# Patient Record
Sex: FEMALE | Race: WHITE | NOT HISPANIC OR LATINO | Employment: OTHER | URBAN - METROPOLITAN AREA
[De-identification: names, ages, dates, MRNs, and addresses within clinical notes are randomized per-mention and may not be internally consistent; named-entity substitution may affect disease eponyms.]

---

## 2023-09-05 RX ORDER — TORSEMIDE 5 MG/1
5 TABLET ORAL DAILY
COMMUNITY

## 2023-09-05 RX ORDER — LOSARTAN POTASSIUM 25 MG/1
25 TABLET ORAL DAILY
COMMUNITY

## 2023-09-06 ENCOUNTER — ANESTHESIA EVENT (OUTPATIENT)
Dept: SURGERY | Facility: CLINIC | Age: 83
End: 2023-09-06
Payer: COMMERCIAL

## 2023-09-06 NOTE — ANESTHESIA PREPROCEDURE EVALUATION
Anesthesia Pre-Procedure Evaluation    Patient: Gillian Arroyo   MRN: [id number] : 1940        Procedure : Procedure(s):  Phacoemulsification with standard intraocular lens implant          No past medical history on file.   No past surgical history on file.   Not on File   Social History     Tobacco Use     Smoking status: Not on file     Smokeless tobacco: Not on file   Substance Use Topics     Alcohol use: Not on file      Wt Readings from Last 1 Encounters:   No data found for Wt        Anesthesia Evaluation   Pt has had prior anesthetic. Type: General and MAC.        ROS/MED HX  ENT/Pulmonary: Comment: [place]    Hoarse voice    (+)                             recent URI,  last episode :        Neurologic:  - neg neurologic ROS     Cardiovascular:     (+)  hypertension- -   -  - -                           valvular problems/murmurs type: MR     Previous cardiac testing   Echo: Date: 2021 Results:  EF 62% bileaflet  mitral valve prolapse with mild to moderate MV regurgitation grade I diastolic dysfunction, mild Left atrial dilation   Stress Test:  Date: Results:    ECG Reviewed:  Date: 2021 Results:  NSR  Cath:  Date: Results:      METS/Exercise Tolerance:     Hematologic:  - neg hematologic  ROS     Musculoskeletal: Comment: Chronic back pain  Knee pain  Shoulder pain  (+)  arthritis,             GI/Hepatic:       Renal/Genitourinary: Comment: Right adrenal cyst      Endo:       Psychiatric/Substance Use:  - neg psychiatric ROS     Infectious Disease:  - neg infectious disease ROS     Malignancy:   (+) Malignancy, History of Skin.Skin CA status post Surgery.      Other:          Physical Exam    Airway  airway exam normal      Mallampati: II   TM distance: > 3 FB   Neck ROM: full   Mouth opening: > 3 cm    Respiratory Devices and Support         Dental  no notable dental history         Cardiovascular   cardiovascular exam normal       Rhythm and rate: regular and normal     Pulmonary    pulmonary exam normal        breath sounds clear to auscultation         OUTSIDE LABS:  CBC: No results found for: WBC, HGB, HCT, PLT  BMP: No results found for: NA, POTASSIUM, CHLORIDE, CO2, BUN, CR, GLC  COAGS: No results found for: PTT, INR, FIBR  POC: No results found for: BGM, HCG, HCGS  HEPATIC: No results found for: ALBUMIN, PROTTOTAL, ALT, AST, GGT, ALKPHOS, BILITOTAL, BILIDIRECT, JOSE  OTHER: No results found for: PH, LACT, A1C, DEVIN, PHOS, MAG, LIPASE, AMYLASE, TSH, T4, T3, CRP, SED    Anesthesia Plan    ASA Status:  3    NPO Status:  NPO Appropriate    Anesthesia Type: MAC.     - Reason for MAC: straight local not clinically adequate   Induction: Intravenous.   Maintenance: TIVA.        Consents    Anesthesia Plan(s) and associated risks, benefits, and realistic alternatives discussed. Questions answered and patient/representative(s) expressed understanding.     - Discussed:     - Discussed with:  Patient      - Extended Intubation/Ventilatory Support Discussed: No.      - Patient is DNR/DNI Status: No     Use of blood products discussed: No .     Postoperative Care    Pain management: Oral pain medications.        Comments:    Other Comments: The risks and benefits of anesthesia, and the alternatives where applicable, have been discussed with the patient, and they wish to proceed.               CHIKI Guzman CRNA

## 2023-09-07 ENCOUNTER — HOSPITAL ENCOUNTER (OUTPATIENT)
Facility: CLINIC | Age: 83
Discharge: HOME | End: 2023-09-07
Attending: STUDENT IN AN ORGANIZED HEALTH CARE EDUCATION/TRAINING PROGRAM | Admitting: STUDENT IN AN ORGANIZED HEALTH CARE EDUCATION/TRAINING PROGRAM
Payer: COMMERCIAL

## 2023-09-07 ENCOUNTER — ANESTHESIA (OUTPATIENT)
Dept: SURGERY | Facility: CLINIC | Age: 83
End: 2023-09-07
Payer: COMMERCIAL

## 2023-09-07 VITALS
OXYGEN SATURATION: 99 % | DIASTOLIC BLOOD PRESSURE: 85 MMHG | HEART RATE: 72 BPM | SYSTOLIC BLOOD PRESSURE: 136 MMHG | TEMPERATURE: 97.6 F | RESPIRATION RATE: 20 BRPM

## 2023-09-07 PROCEDURE — 370N000004 HC ANESTHESIA CATARACT PACKAGE: Performed by: STUDENT IN AN ORGANIZED HEALTH CARE EDUCATION/TRAINING PROGRAM

## 2023-09-07 PROCEDURE — 360N000007 HC CATARACT SURGICAL PACKAGE: Performed by: STUDENT IN AN ORGANIZED HEALTH CARE EDUCATION/TRAINING PROGRAM

## 2023-09-07 PROCEDURE — 250N000011 HC RX IP 250 OP 636: Performed by: NURSE ANESTHETIST, CERTIFIED REGISTERED

## 2023-09-07 PROCEDURE — 250N000009 HC RX 250: Performed by: STUDENT IN AN ORGANIZED HEALTH CARE EDUCATION/TRAINING PROGRAM

## 2023-09-07 PROCEDURE — V2632 POST CHMBR INTRAOCULAR LENS: HCPCS | Performed by: STUDENT IN AN ORGANIZED HEALTH CARE EDUCATION/TRAINING PROGRAM

## 2023-09-07 PROCEDURE — 761N000008 HC RECOVERY CATRACT PACKAGE: Performed by: STUDENT IN AN ORGANIZED HEALTH CARE EDUCATION/TRAINING PROGRAM

## 2023-09-07 PROCEDURE — 250N000011 HC RX IP 250 OP 636: Mod: JZ | Performed by: STUDENT IN AN ORGANIZED HEALTH CARE EDUCATION/TRAINING PROGRAM

## 2023-09-07 DEVICE — EYE IMP IOL AMO PCL TECNIS ZCB00 21.5: Type: IMPLANTABLE DEVICE | Site: EYE | Status: FUNCTIONAL

## 2023-09-07 RX ORDER — LIDOCAINE 40 MG/G
CREAM TOPICAL
Status: DISCONTINUED | OUTPATIENT
Start: 2023-09-07 | End: 2023-09-07 | Stop reason: HOSPADM

## 2023-09-07 RX ORDER — PHENYLEPHRINE HYDROCHLORIDE 25 MG/ML
1 SOLUTION/ DROPS OPHTHALMIC
Status: DISCONTINUED | OUTPATIENT
Start: 2023-09-07 | End: 2023-09-07 | Stop reason: HOSPADM

## 2023-09-07 RX ORDER — DICLOFENAC SODIUM 1 MG/ML
1 SOLUTION/ DROPS OPHTHALMIC
Status: DISCONTINUED | OUTPATIENT
Start: 2023-09-07 | End: 2023-09-07 | Stop reason: HOSPADM

## 2023-09-07 RX ORDER — MOXIFLOXACIN 5 MG/ML
1 SOLUTION/ DROPS OPHTHALMIC
Status: DISCONTINUED | OUTPATIENT
Start: 2023-09-07 | End: 2023-09-07 | Stop reason: HOSPADM

## 2023-09-07 RX ORDER — PROPARACAINE HYDROCHLORIDE 5 MG/ML
1 SOLUTION/ DROPS OPHTHALMIC ONCE
Status: COMPLETED | OUTPATIENT
Start: 2023-09-07 | End: 2023-09-07

## 2023-09-07 RX ORDER — PROPARACAINE HYDROCHLORIDE 5 MG/ML
1 SOLUTION/ DROPS OPHTHALMIC ONCE
Status: DISCONTINUED | OUTPATIENT
Start: 2023-09-07 | End: 2023-09-07 | Stop reason: HOSPADM

## 2023-09-07 RX ORDER — PREDNISOLONE ACETATE 1 %
SUSPENSION, DROPS(FINAL DOSAGE FORM)(ML) OPHTHALMIC (EYE) PRN
Status: DISCONTINUED | OUTPATIENT
Start: 2023-09-07 | End: 2023-09-07 | Stop reason: HOSPADM

## 2023-09-07 RX ORDER — CYCLOPENTOLATE HYDROCHLORIDE 10 MG/ML
1 SOLUTION/ DROPS OPHTHALMIC
Status: DISCONTINUED | OUTPATIENT
Start: 2023-09-07 | End: 2023-09-07 | Stop reason: HOSPADM

## 2023-09-07 RX ADMIN — DICLOFENAC SODIUM 1 DROP: 1 SOLUTION/ DROPS OPHTHALMIC at 10:37

## 2023-09-07 RX ADMIN — CYCLOPENTOLATE HYDROCHLORIDE 1 DROP: 10 SOLUTION/ DROPS OPHTHALMIC at 10:36

## 2023-09-07 RX ADMIN — PHENYLEPHRINE HYDROCHLORIDE 1 DROP: 25 SOLUTION/ DROPS OPHTHALMIC at 10:37

## 2023-09-07 RX ADMIN — CYCLOPENTOLATE HYDROCHLORIDE 1 DROP: 10 SOLUTION/ DROPS OPHTHALMIC at 10:47

## 2023-09-07 RX ADMIN — DICLOFENAC SODIUM 1 DROP: 1 SOLUTION/ DROPS OPHTHALMIC at 10:47

## 2023-09-07 RX ADMIN — PROPARACAINE HYDROCHLORIDE 1 DROP: 5 SOLUTION/ DROPS OPHTHALMIC at 10:36

## 2023-09-07 RX ADMIN — MIDAZOLAM 2 MG: 1 INJECTION INTRAMUSCULAR; INTRAVENOUS at 12:18

## 2023-09-07 RX ADMIN — PHENYLEPHRINE HYDROCHLORIDE 1 DROP: 25 SOLUTION/ DROPS OPHTHALMIC at 10:47

## 2023-09-07 RX ADMIN — MOXIFLOXACIN 1 DROP: 5 SOLUTION/ DROPS OPHTHALMIC at 10:47

## 2023-09-07 RX ADMIN — LIDOCAINE HYDROCHLORIDE 1 ML: 10 INJECTION, SOLUTION EPIDURAL; INFILTRATION; INTRACAUDAL; PERINEURAL at 10:59

## 2023-09-07 RX ADMIN — MOXIFLOXACIN 1 DROP: 5 SOLUTION/ DROPS OPHTHALMIC at 10:37

## 2023-09-07 ASSESSMENT — ACTIVITIES OF DAILY LIVING (ADL)
ADLS_ACUITY_SCORE: 35
ADLS_ACUITY_SCORE: 35

## 2023-09-07 NOTE — ANESTHESIA POSTPROCEDURE EVALUATION
Patient: Gillian Arroyo    Procedure: Procedure(s):  Phacoemulsification with standard intraocular lens implant       Anesthesia Type:  MAC    Note:  Disposition: Outpatient   Postop Pain Control: Uneventful            Sign Out: Well controlled pain   PONV: No   Neuro/Psych: Uneventful            Sign Out: Acceptable/Baseline neuro status   Airway/Respiratory: Uneventful            Sign Out: Acceptable/Baseline resp. status   CV/Hemodynamics: Uneventful            Sign Out: Acceptable CV status   Other NRE: NONE   DID A NON-ROUTINE EVENT OCCUR? No    Event details/Postop Comments:  Pt was happy with anesthesia care.  No complications.  I will follow up with the pt if needed.       Last vitals:  Vitals Value Taken Time   /85 09/07/23 1300   Temp     Pulse 72 09/07/23 1300   Resp 20 09/07/23 1251   SpO2 99 % 09/07/23 1303   Vitals shown include unvalidated device data.    Electronically Signed By: CHIKI Guzman CRNA  September 7, 2023  1:35 PM

## 2023-09-07 NOTE — ANESTHESIA CARE TRANSFER NOTE
Patient: Gillian Arroyo    Procedure: Procedure(s):  Phacoemulsification with standard intraocular lens implant       Diagnosis: Cataract [H26.9]  Diagnosis Additional Information: No value filed.    Anesthesia Type:   MAC     Note:    Oropharynx: oropharynx clear of all foreign objects and spontaneously breathing  Level of Consciousness: drowsy  Oxygen Supplementation: room air    Independent Airway: airway patency satisfactory and stable  Dentition: dentition unchanged  Vital Signs Stable: post-procedure vital signs reviewed and stable  Report to RN Given: handoff report given  Patient transferred to: Phase II    Handoff Report: Identifed the Patient, Identified the Reponsible Provider, Reviewed the pertinent medical history, Discussed the surgical course, Reviewed Intra-OP anesthesia mangement and issues during anesthesia, Set expectations for post-procedure period and Allowed opportunity for questions and acknowledgement of understanding  Vitals:  Vitals Value Taken Time   BP     Temp     Pulse     Resp     SpO2 99 % 09/07/23 1251   Vitals shown include unvalidated device data.    Electronically Signed By: CHIKI Guzman CRNA  September 7, 2023  12:52 PM

## 2023-09-07 NOTE — BRIEF OP NOTE
[facility]    Brief Operative Note    Pre-operative diagnosis: Cataract, Right Eye  Post-operative diagnosis Same as pre-operative diagnosis    Procedure: Procedure(s):  Phacoemulsification with standard intraocular lens implant  Surgeon: Surgeon(s) and Role:     * Arthur Becker MD - Primary  Anesthesia: MAC with Topical   Estimated Blood Loss: 0 mL from 9/7/2023 12:19 PM to 9/7/2023 12:47 PM      Drains: None  Specimens: * No specimens in log *  Findings:   None.  Complications: None.  Implants:   Implant Name Type Inv. Item Serial No.  Lot No. LRB No. Used Action   EYE IMP IOL NABIL PCL TECNIS ZCB00 21.5 - [id number] Lens/Eye Implant EYE IMP IOL NABIL PCL TECNIS ZCB00 21.5 [id number] [employer]  Right 1 Implanted

## 2023-09-07 NOTE — OP NOTE
[facility]  Ophthalmology Operative Note    PREOPERATIVE DIAGNOSIS: Cataract, Right eye.     POSTOPERATIVE DIAGNOSIS: Cataract, Right eye.     OPERATION: Cataract extraction with placement of posterior chamber intraocular lens in the Right eye.     ANESTHESIA: MAC combined with topical     INDICATIONS FOR PROCEDURE: Gillian Arroyo was seen in the [facility] for decreased visual acuity in the Right eye. The patient was found to have a visually significant cataract in the Right eye. The risks, benefits, alternatives and goals of cataract extraction were discussed with the patient, and after adequate discussion the patient understood and agreed to these, and a signed informed consent was obtained prior to the procedure.     DESCRIPTION OF PROCEDURE: After proper patient identification, topical anesthesia was applied to the Right eye. The patient was brought to the operating room and the Right eye was prepped and draped in the usual sterile fashion for intraocular surgery. A lid speculum was placed in the Right eye. A paracentesis was then created and the anterior chamber was filled with 1% non-preserved lidocaine followed by Endocoat. A clear corneal incision was then created temporally using a 2.4mm keratome. A continuous curvilinear capsulorrhexis was then created using a cystotome and Utrata forceps. Hydrodissection was carried out with BSS on a Gooden cannula and the lens rotated freely within the capsular bag. Phacoemulsification was then carried out using the divide and conquer technique. Residual cortical material was removed using the I&A handpiece. The capsular bag was then filled with Healon and a ZCB00 +21.5 diopter intraocular lens was then injected into the capsular bag. The lens showed good centration and stability. Residual viscoelastic was removed using the I&A handpiece. The wound was then hydrated and the anterior chamber reformed. Intracameral  Moxifloxacin was then injected into the anterior chamber. The wounds were then checked and found to be sealed. Topical Prednisolone drops were placed in the patient's Right eye followed by a [place] shield over the top of this. The patient tolerated the procedure well without complications and was told to follow up in the clinic in the next postoperative day.     Implant Name Type Inv. Item Serial No.  Lot No. LRB No. Used Action   EYE IMP IOL NABIL PCL TECNIS ZCB00 21.5 - [id number] Lens/Eye Implant EYE IMP IOL NABIL PCL TECNIS ZCB00 21.5 [id number] [employer]  Right 1 Implanted        Arthur Becker MD

## (undated) DEVICE — SOL NACL 0.9% IRRIG 1000ML BOTTLE 07138-09

## (undated) DEVICE — SOL WATER IRRIG 1000ML BOTTLE 07139-09

## (undated) DEVICE — GLOVE PROTEGRITY 7.5 LATEX

## (undated) RX ORDER — FENTANYL CITRATE 50 UG/ML
INJECTION, SOLUTION INTRAMUSCULAR; INTRAVENOUS
Status: DISPENSED
Start: 2023-09-07